# Patient Record
Sex: MALE | Race: WHITE | NOT HISPANIC OR LATINO | Employment: OTHER | URBAN - METROPOLITAN AREA
[De-identification: names, ages, dates, MRNs, and addresses within clinical notes are randomized per-mention and may not be internally consistent; named-entity substitution may affect disease eponyms.]

---

## 2023-11-01 DIAGNOSIS — G62.89 OTHER POLYNEUROPATHY: ICD-10-CM

## 2023-11-01 RX ORDER — ATORVASTATIN CALCIUM 40 MG/1
40 TABLET, FILM COATED ORAL NIGHTLY
COMMUNITY

## 2023-11-01 RX ORDER — GABAPENTIN 400 MG/1
1200 CAPSULE ORAL 3 TIMES DAILY
COMMUNITY
Start: 2023-10-12 | End: 2023-11-01 | Stop reason: SDUPTHER

## 2023-11-01 RX ORDER — ZOLPIDEM TARTRATE 12.5 MG/1
12.5 TABLET, FILM COATED, EXTENDED RELEASE ORAL NIGHTLY PRN
COMMUNITY

## 2023-11-01 RX ORDER — OMEPRAZOLE 20 MG/1
20 CAPSULE, DELAYED RELEASE ORAL 2 TIMES DAILY
COMMUNITY

## 2023-11-01 RX ORDER — MELOXICAM 7.5 MG/1
7.5 TABLET ORAL DAILY
COMMUNITY

## 2023-11-01 RX ORDER — NORTRIPTYLINE HYDROCHLORIDE 10 MG/1
20 CAPSULE ORAL NIGHTLY
COMMUNITY
Start: 2017-11-10 | End: 2024-04-30 | Stop reason: SDUPTHER

## 2023-11-01 RX ORDER — LOSARTAN POTASSIUM 50 MG/1
1 TABLET ORAL DAILY
COMMUNITY
Start: 2017-09-07

## 2023-11-01 RX ORDER — ACETAMINOPHEN 500 MG
2 TABLET ORAL DAILY
COMMUNITY
Start: 2013-08-02

## 2023-11-02 RX ORDER — GABAPENTIN 400 MG/1
1200 CAPSULE ORAL 3 TIMES DAILY
Qty: 270 CAPSULE | Refills: 2 | Status: SHIPPED | OUTPATIENT
Start: 2023-11-02 | End: 2023-12-09 | Stop reason: SDUPTHER

## 2023-11-16 ENCOUNTER — TELEPHONE (OUTPATIENT)
Dept: NEUROLOGY | Facility: HOSPITAL | Age: 83
End: 2023-11-16
Payer: MEDICARE

## 2023-11-16 NOTE — TELEPHONE ENCOUNTER
----- Message from Marquise Kemp DO sent at 11/15/2023  5:31 PM EST -----  Regarding: RE: Mike Olmos -  current symptoms  Contact: [phone]  He is going to increase his evening dose by 400mg. I also spoke with him about trying something topical. Phan, I have a transdermal therapeutics form for you that we can send tomorrow.  ----- Message -----  From: Alivia Glass  Sent: 11/14/2023   9:39 AM EST  To: Diane Reich MD; Marquise Kemp DO; #  Subject: Mike Olmos -  current symptoms                Patient called back in; said he spoke to you regarding burning in his feet that occurs at night still (even with taking gabapentin 1200 mg tid.  He is asking if he should take an extra around dinner to help with this.  He is aware that Dr. Reich is out.

## 2023-11-16 NOTE — TELEPHONE ENCOUNTER
----- Message from Marquise Kemp DO sent at 11/15/2023  5:31 PM EST -----  Regarding: RE: Mike Olmos -  current symptoms  Contact: [phone]  He is going to increase his evening dose by 400mg. I also spoke with him about trying something topical. Phan, I have a transdermal therapeutics form for you that we can send tomorrow.  ----- Message -----  From: Alivia Glass  Sent: 11/14/2023   9:39 AM EST  To: Diane Reich MD; Marquise Kemp DO; #  Subject: Mike Omlos -  current symptoms                Patient called back in; said he spoke to you regarding burning in his feet that occurs at night still (even with taking gabapentin 1200 mg tid.  He is asking if he should take an extra around dinner to help with this.  He is aware that Dr. Reich is out.

## 2023-12-08 ENCOUNTER — OFFICE VISIT (OUTPATIENT)
Dept: NEUROLOGY | Facility: CLINIC | Age: 83
End: 2023-12-08
Payer: MEDICARE

## 2023-12-08 DIAGNOSIS — G62.89 OTHER POLYNEUROPATHY: ICD-10-CM

## 2023-12-08 PROCEDURE — 99213 OFFICE O/P EST LOW 20 MIN: CPT | Performed by: PSYCHIATRY & NEUROLOGY

## 2023-12-08 NOTE — PROGRESS NOTES
Date of Service: 12/8/2023  Patient: Mike Olmos  MRN: [id number]  Referring Provider: No ref. provider found  Primary Care Physician: Steve Workman MD     History of Present Illness:    Mr. Olmos is a 83 y.o. male who presents for follow up of peripheral neuropathy.    Had decreased gabapentin to below 1200mg TID as discussed at last visit with target to get to 800 mg TID. He is not certain how much he reduced his gabapentin to before his symptoms worsened.  Increased sensation and pain and swelling in the feet led to increase.     He called our office and his gabapentin was increased to [phone].    He has increased to [id number] mg TID. Has been on nortriptyline 20mg at bedtime. The last couple days have been better. He had a phone call with [facility] Nov 16 with plan to increase the gabapentin to [phone] mg TID--he has been on an increased dose for ~ 2 to 3 weeks.     Also takes Ambien at bedtime for insomnia.     Still walking and exercising.      Problem List Items Addressed This Visit    None      No Known Allergies     Medications:    Current Outpatient Medications:     atorvastatin (Lipitor) 40 mg tablet, Take 1 tablet (40 mg) by mouth once daily at bedtime., Disp: , Rfl:     cholecalciferol (Vitamin D-3) 50 mcg (2,000 unit) capsule, Take 2 tablets by mouth once daily., Disp: , Rfl:     gabapentin (Neurontin) 400 mg capsule, Take 3 capsules (1,200 mg) by mouth 3 times a day., Disp: 270 capsule, Rfl: 2    losartan (Cozaar) 50 mg tablet, Take 1 tablet (50 mg) by mouth once daily., Disp: , Rfl:     meloxicam (Mobic) 7.5 mg tablet, Take 1 tablet (7.5 mg) by mouth once daily., Disp: , Rfl:     nortriptyline (Pamelor) 10 mg capsule, Take 2 capsules (20 mg) by mouth once daily at bedtime., Disp: , Rfl:     omeprazole (PriLOSEC) 20 mg DR capsule, Take 1 capsule (20 mg) by mouth 2 times a day., Disp: , Rfl:     zolpidem CR (Ambien CR) 12.5 mg ER tablet, Take 1 tablet (12.5 mg) by  mouth as needed at bedtime for sleep., Disp: , Rfl:      Physical Exam:     There were no vitals taken for this visit.    Brief Neurological Exam:    Motor: Strength 5/5 in distal and proximal muscles including toe extension, flexion, ankle dorsiflexion and plantarflexion    Sensation in feet intact to light touch and pinprick in both feet    Results:     The following labs, imaging, and results were personally reviewed and demonstrated:    Labs:  Lab Results   Component Value Date    HGBA1C 5.7 (H) 12/04/2023       Impression/Plan:     Impression:  Mike Olmos is a 83 y.o. who presents for follow-up of peripheral neuropathy. His symptoms worsened when he attempted to decrease gabapentin below 1200 mg TID.  He is currently on [id number] mg TID.  He started this increased dose about 3 weeks ago and has been tolerating it. His symptoms feel improved over the last couple days.    He is also on nortriptyline 20 mg at night.     Plan:  -- continue notriptyline 20 mg at night  -- continue gabapentin [phone] mg TID       Reviewed and approved by THERESA LAURENT on 12/8/23 at 1:19 PM.    I personally spent 25 minutes on the day of the visit completing the review of the medical record and outside records, obtaining history and performing an appropriate physical exam, patient care, counseling and education, communicating with the patient, coordinating care and performing appropriate clinical documentation.

## 2023-12-09 RX ORDER — GABAPENTIN 400 MG/1
CAPSULE ORAL
Qty: 300 CAPSULE | Refills: 10 | Status: SHIPPED | OUTPATIENT
Start: 2023-12-09 | End: 2024-02-07 | Stop reason: DRUGHIGH

## 2023-12-21 ENCOUNTER — TELEMEDICINE (OUTPATIENT)
Dept: NEUROLOGY | Facility: HOSPITAL | Age: 83
End: 2023-12-21
Payer: MEDICARE

## 2023-12-21 DIAGNOSIS — G62.9 NEUROPATHY: Primary | ICD-10-CM

## 2023-12-21 PROCEDURE — 99212 OFFICE O/P EST SF 10 MIN: CPT | Performed by: PSYCHIATRY & NEUROLOGY

## 2023-12-21 PROCEDURE — 99212 OFFICE O/P EST SF 10 MIN: CPT | Mod: GT,95 | Performed by: PSYCHIATRY & NEUROLOGY

## 2023-12-21 NOTE — PROGRESS NOTES
Virtual or Telephone Consent    A telephone visit (audio only) between the patient (at the originating site) and the provider (at the distant site) was utilized to provide this telehealth service.   Verbal consent was requested and obtained from Mike Olmos on this date, 01/04/24 for a telehealth visit.     Date of Service: 12/21/2023  Patient: Mike Olmos  MRN: [id number]  Referring Provider: No ref. provider found  Primary Care Physician: Steve Workman MD     History of Present Illness:    Mr. Olmos is a 83 y.o. male who presents for follow up of pain in the feet.     Since increasing his medications, his pain is better controlled. He sometimes has occasional right and sometimes left foot symptoms, but not always both at the same type.  A swelling feeling--not actually swollen, a light burning pain feeling. Lasts for a minute or so. It always occurs when feet are elevated and moreso at the end of the day.    It does not bother him or interfere with his daily life. The fourth capsule of gabapentin has helped him      No Known Allergies     Medications:    Current Outpatient Medications:     atorvastatin (Lipitor) 40 mg tablet, Take 1 tablet (40 mg) by mouth once daily at bedtime., Disp: , Rfl:     cholecalciferol (Vitamin D-3) 50 mcg (2,000 unit) capsule, Take 2 tablets by mouth once daily., Disp: , Rfl:     gabapentin (Neurontin) 400 mg capsule, Take 3 capsules in the morning, 3 capsules in the afternoon, and 4 capsules at night time, Disp: 300 capsule, Rfl: 10    losartan (Cozaar) 50 mg tablet, Take 1 tablet (50 mg) by mouth once daily., Disp: , Rfl:     meloxicam (Mobic) 7.5 mg tablet, Take 1 tablet (7.5 mg) by mouth once daily., Disp: , Rfl:     nortriptyline (Pamelor) 10 mg capsule, Take 2 capsules (20 mg) by mouth once daily at bedtime., Disp: , Rfl:     omeprazole (PriLOSEC) 20 mg DR capsule, Take 1 capsule (20 mg) by mouth 2 times a day., Disp: , Rfl:     zolpidem CR (Ambien CR) 12.5 mg ER  tablet, Take 1 tablet (12.5 mg) by mouth as needed at bedtime for sleep., Disp: , Rfl:      Physical Exam:     There were no vitals taken for this visit.    Telephone Visit, therefore no exam      Results:     The following labs, imaging, and results were personally reviewed and demonstrated:    Labs:  Lab Results   Component Value Date    HGBA1C 5.7 (H) 12/04/2023       Impression/Plan:     Impression:  Mike Olmos is a 83 y.o. who presents for follow-up of pain in his feet which has improved with mild increase in his gabapentin. He is currently taking [phone] mg.    Plan:  - continue gabapentin    --------------------------------------------------------------------------------------------------------------------------    I personally spent 10 minutes on the day of the visit completing the review of the medical record and outside records, obtaining history, patient care, counseling and education, coordinating care and performing appropriate clinical documentation.    Diane Reich MD  Neuromuscular Neurology  [facility]  Office Phone Number: [phone]

## 2024-02-07 DIAGNOSIS — G62.89 OTHER POLYNEUROPATHY: ICD-10-CM

## 2024-02-07 RX ORDER — GABAPENTIN 800 MG/1
TABLET ORAL
Qty: 450 TABLET | Refills: 2 | Status: SHIPPED | OUTPATIENT
Start: 2024-02-07 | End: 2024-04-30 | Stop reason: SDUPTHER

## 2024-04-30 DIAGNOSIS — G62.89 OTHER POLYNEUROPATHY: ICD-10-CM

## 2024-04-30 DIAGNOSIS — G62.9 NEUROPATHY: ICD-10-CM

## 2024-04-30 RX ORDER — NORTRIPTYLINE HYDROCHLORIDE 10 MG/1
20 CAPSULE ORAL NIGHTLY
Qty: 180 CAPSULE | Refills: 3 | Status: SHIPPED | OUTPATIENT
Start: 2024-04-30 | End: 2025-04-30

## 2024-04-30 RX ORDER — GABAPENTIN 400 MG/1
CAPSULE ORAL
Qty: 300 TABLET | Refills: 6 | Status: SHIPPED | OUTPATIENT
Start: 2024-04-30

## 2024-05-16 ENCOUNTER — OFFICE VISIT (OUTPATIENT)
Dept: NEUROLOGY | Facility: CLINIC | Age: 84
End: 2024-05-16
Payer: MEDICARE

## 2024-05-16 DIAGNOSIS — G62.9 PERIPHERAL POLYNEUROPATHY: Primary | ICD-10-CM

## 2024-05-16 PROCEDURE — 99213 OFFICE O/P EST LOW 20 MIN: CPT | Performed by: PSYCHIATRY & NEUROLOGY

## 2024-05-16 NOTE — PROGRESS NOTES
Date of Service: 5/16/2024  Patient: Mike Olmos  MRN: [id number]  Referring Provider: No ref. provider found  Primary Care Physician: Steve Workman MD     History of Present Illness:    Mr. Olmos is a 83 y.o. male who presents for follow up of peripheral neuropathy.  He has improvement in his pain with taking gabapentin [phone] mg. He continues to complete a consistent exercise routine.          Problem List Items Addressed This Visit    None      No Known Allergies     Medications:    Current Outpatient Medications:     atorvastatin (Lipitor) 40 mg tablet, Take 1 tablet (40 mg) by mouth once daily at bedtime., Disp: , Rfl:     cholecalciferol (Vitamin D-3) 50 mcg (2,000 unit) capsule, Take 2 tablets by mouth once daily., Disp: , Rfl:     gabapentin (Neurontin) 400 mg capsule, Take 3 caps in the morning, 3 caps in the afternoon, and 4 caps in the evening., Disp: 300 tablet, Rfl: 6    losartan (Cozaar) 50 mg tablet, Take 1 tablet (50 mg) by mouth once daily., Disp: , Rfl:     meloxicam (Mobic) 7.5 mg tablet, Take 1 tablet (7.5 mg) by mouth once daily., Disp: , Rfl:     nortriptyline (Pamelor) 10 mg capsule, Take 2 capsules (20 mg) by mouth once daily at bedtime., Disp: 180 capsule, Rfl: 3    omeprazole (PriLOSEC) 20 mg DR capsule, Take 1 capsule (20 mg) by mouth 2 times a day., Disp: , Rfl:     zolpidem CR (Ambien CR) 12.5 mg ER tablet, Take 1 tablet (12.5 mg) by mouth as needed at bedtime for sleep., Disp: , Rfl:      Physical Exam:     There were no vitals taken for this visit.    Brief Neurological Exam:  Alert and Awake  Strength is 5/5 distal and proximal in arms and legs    Sensation is intact to light touch and pinprick in his hands and feet.        Results:     The following labs, imaging, and results were personally reviewed and demonstrated:    Labs:  Lab Results   Component Value Date    HGBA1C 5.7 (H) 12/04/2023       Impression/Plan:     Impression:  Mike Olmos is a 83 y.o. who  presents for follow-up of peripheral neuropathy, stable with neuropathic pain. Pain has improved with being on gabapentin [id number]. He also takes nortriptyline 20 mg at bedtime.    Plan:  -- continue gabapentin  -- continue notriptyline       Reviewed and approved by THERESA LAURENT on 5/16/24 at 2:56 PM.    I personally spent 25 minutes on the day of the visit completing the review of the medical record and outside records, obtaining history and performing an appropriate physical exam, patient care, counseling and education, independently reviewing results, communicating with the patient, coordinating care

## 2024-09-19 ENCOUNTER — TELEPHONE (OUTPATIENT)
Dept: DERMATOLOGY | Facility: CLINIC | Age: 84
End: 2024-09-19
Payer: MEDICARE

## 2024-09-19 NOTE — TELEPHONE ENCOUNTER
Called patient to discuss pre-operative expectations ahead of his procedure with Dr. Blanc for Mohs surgery. He voiced no other questions or concerns.

## 2024-09-23 ENCOUNTER — APPOINTMENT (OUTPATIENT)
Dept: DERMATOLOGY | Facility: CLINIC | Age: 84
End: 2024-09-23
Payer: MEDICARE

## 2024-09-23 VITALS — HEART RATE: 75 BPM | DIASTOLIC BLOOD PRESSURE: 81 MMHG | SYSTOLIC BLOOD PRESSURE: 137 MMHG

## 2024-09-23 DIAGNOSIS — C44.319 BASAL CELL CARCINOMA (BCC) OF SKIN OF OTHER PART OF FACE: ICD-10-CM

## 2024-09-23 PROCEDURE — 17311 MOHS 1 STAGE H/N/HF/G: CPT | Performed by: STUDENT IN AN ORGANIZED HEALTH CARE EDUCATION/TRAINING PROGRAM

## 2024-09-23 PROCEDURE — 13132 CMPLX RPR F/C/C/M/N/AX/G/H/F: CPT | Performed by: STUDENT IN AN ORGANIZED HEALTH CARE EDUCATION/TRAINING PROGRAM

## 2024-09-23 PROCEDURE — 17312 MOHS ADDL STAGE: CPT | Performed by: STUDENT IN AN ORGANIZED HEALTH CARE EDUCATION/TRAINING PROGRAM

## 2024-09-23 RX ORDER — ASPIRIN 81 MG/1
81 TABLET ORAL
COMMUNITY
Start: 2023-02-20

## 2024-09-23 NOTE — PROGRESS NOTES
Office Visit Note  Date: 9/23/2024  Surgeon:  Patrice Blanc MD  Office Location:  [address]  [address]  [facility] [address]  Dept: [phone]  Dept Fax: [phone]  Referring Provider: Donnell Durand MD  [address]  [address]  [place] [id number]    Subjective   Mike Olmos is a 83 y.o. male who presents for the following: MOHS Surgery    According to the patient, the lesion has been present for approximately 6 months at the time of diagnosis.  The lesion is not causing symptoms.  The lesion has not been treated previously.    The patient does not have a pacemaker / defibrillator.  The patient does have a heart valve / joint replacement.    The patient is on blood thinners.  The patient does not have a history of hepatitis B or C.  The patient does not have a history of HIV.  The patient does not have a history of immunosuppression (e.g. organ transplantation, malignancy, medications)    Review of Systems:  No other skin or systemic complaints other than what is documented elsewhere in the note.    MEDICAL HISTORY: clinically relevant history including significant past medical history, medications and allergies was reviewed and documented in Epic.    Objective   Well appearing patient in no apparent distress; mood and affect are within normal limits.  Vital signs: See record.  Noted on the Right Cheek  Is a 0.8 x 0.8 cm scar        The patient confirmed the identified site.    Discussion:  The nature of the diagnosis was explained. The lesion is a skin cancer.  It has a risk of local growth and distant spread. The condition is associated with sun exposure.  Warning signs of non-melanoma skin cancer discussed. Patient was instructed to perform monthly self skin examination.  We recommended that the patient have regular full skin exams given an increased risk of subsequent skin cancers. The patient was instructed to use sun protective behaviors including use of broad  spectrum sunscreens and sun protective clothing to reduce risk of skin cancers.      Risks, benefits, side effects of Mohs surgery were discussed with patient and the patient voiced understanding.  It was explained that even though the cure rate of Mohs is very high it is not 100%. Risks of surgery including but not limited to bleeding, infection, numbness, nerve damage, and scar were reviewed.  Discussion included wound care requirements, activity restrictions, likely scar outcome and time to heal.     After Mohs surgery, the defect may need to be repaired surgically and the scar may be longer than the original lesion.  Reconstruction options, risks, and benefits were reviewed including second intention healing, linear repair (4-1 ratio was explained), local flaps, skin grafts, cartilage grafts and interpolation flaps (the need for multiple surgeries was explained). Possible outcomes were reviewed including likely scar appearance, failure of flap survival, infection, bleeding and the need for revision surgery.     The pathology was reviewed, the photograph was reviewed, and the referring physician's note was reviewed.    Patient elected for Mohs surgery.

## 2024-09-23 NOTE — PROGRESS NOTES
Mohs Surgery Operative Note    Date of Surgery:  9/23/2024  Surgeon:  Patrice Blanc MD  Office Location: [address]  [address]  [facility] [address]  Dept: [phone]  Dept Fax: [phone]  Referring Provider: Donnell Durand MD  [address]  [address]  [place] [address]      Assessment/Plan   Pre-procedure:   Obtained informed consent: written from patient  The surgical site was identified and confirmed with the patient.     Intra-operative:   Audible time out called at : 08:55 AM 09/23/24  by: DESIREE CHIRINOS RN   Verified patient name, birthdate, site, specimen bottle label & requisition.    The planned procedure(s) was again reviewed with the patient. The risks of bleeding, infection, nerve damage and scarring were reviewed. Written authorization was obtained. The patient identity, surgical site, and planned procedure(s) were verified. The provider acted as both surgeon and pathologist.     Basal cell carcinoma (BCC) of skin of other part of face  Right Cheek    Mohs surgery    Consent obtained: written    Universal Protocol:  Procedure explained and questions answered to patient or proxy's satisfaction: Yes    Test results available and properly labeled: Yes    Pathology report reviewed: Yes    External notes reviewed: Yes    Photo or diagram used for site identification: Yes    Site/side marked: Yes    Slide independently reviewed by Mohs surgeon: Yes    Immediately prior to procedure a time out was called: Yes    Patient identity confirmed: verbally with patient  Preparation: Patient was prepped and draped in usual sterile fashion      Anticoagulation:  Is the patient taking prescription anticoagulant and/or aspirin prescribed/recommended by a physician? Yes    Was the anticoagulation regimen changed prior to Mohs? No      Anesthesia:  Anesthesia method: local infiltration  Local anesthetic: lidocaine 1% WITH epi    Procedure Details:  Case ID Number:  [id number]  Biopsy accession number: [id number]  Date of biopsy: 7/17/2024  Pre-Op diagnosis: basal cell carcinoma  BCC subtype: nodular  Surgical site (from skin exam): Right Cheek  Pre-operative length (cm): 0.8  Pre-operative width (cm): 0.8  Indications for Mohs surgery: anatomic location where tissue conservation is critical  Previously treated? No      Micrographic Surgery Details:  Post-operative length (cm): 1.2  Post-operative width (cm): 1.6  Number of Mohs stages: 3    Stage 1     Comments: The patient was brought into the operating room and placed in the procedure chair in the appropriate position.  The area positive by previous biopsy was identified and confirmed with the patient. The area of clinically obvious tumor was debulked using a curette and/or scalpel as needed. An incision was made following the Mohs approach through the skin. The specimen was taken to the lab, divided into 2 piece(s) and appropriately chromacoded and processed.    Tumor was seen on the lateral margins as indicated on the on the Mohs map.  Nodular basal cell carcinoma. Histologic examination revealed large tumor aggregates of atypical basaloid cells with peripheral palisading and tumoral clefting.              Tumor features identified on Mohs section: basal carcinoma      Depth of invasion: dermis    Stage 2     Comments: The area of positivity as noted on the Mohs map in the previous stage was identified and removed using the Mohs technique. The specimen was taken to the lab and appropriately chromacoded and processed in 2 piece(s).    Tumor was seen on the lateral margins as indicated on the on the Mohs map.  Nodular basal cell carcinoma. Histologic examination revealed large tumor aggregates of atypical basaloid cells with peripheral palisading and tumoral clefting. AND  Superficial basal cell carcinoma. Histologic examination revealed small buds of atypical basaloid cells with peripheral palisading and tumoral  clefting.           Tumor features identified on Mohs section: basal carcinoma     Depth of invasion: dermis    Stage 3     Comments: The area of positivity as noted on the Mohs map in the previous stage was identified and removed using the Mohs technique. The specimen was taken to the lab and appropriately chromacoded and processed in 2 piece(s).       Tumor features identified on Mohs section: no tumor identified    Depth of defect: subcutaneous fat    Patient tolerance of procedure: tolerated well, no immediate complications    Reconstruction:  Was the defect reconstructed? Yes    Was reconstruction performed by the same Mohs surgeon? Yes    Setting of reconstruction: outpatient office  Type of reconstruction: linear  Linear reconstruction: complex  Subcutaneous Layers (Deep Stitches)   Suture size:  5-0  Suture type:  Vicryl  Stitches:  Buried vertical mattress  Fine/surface layer approximation (top stitches)   Epidermal/Superficial suture size:  5-0  Epidermal/Superficial suture type:  Fast-absorbing gut  Stitches: simple interrupted and simple running    Hemostasis achieved with: electrodesiccation  Outcome: patient tolerated procedure well with no complications    Post-procedure details: sterile dressing applied and wound care instructions given    Dressing type: pressure dressing      Staff Communication: Dermatology Local Anesthesia: 1 % Lidocaine / Epinephrine - Amount: 6.5 ml        Complex Linear Repair - Wide Undermining:  Given the location and size of the defect, it was determined that a complex layered linear closure was required to restore normal anatomy and function. The repair was considered complex because extensive undermining was required and performed. The amount of undermining performed was greater than the maximum width of the defect as measured perpendicular to the closure line along at least one entire edge of the defect. Standing cutaneous cones were removed using Burow's triangles. The  wound edges were brought into close approximation with buried vertical mattress sutures. The remainder of the wound was then closed with epidermal top sutures.    The final repair measured 4.1 cm                Wound care was discussed, and the patient was given written post-operative wound care instructions.      The patient will follow up with Patrice Blanc MD as needed for any post operative problems or concerns, and will follow up with their primary dermatologist as scheduled.

## 2024-09-23 NOTE — LETTER
[facility] Provider/Referral Letter Treatment Plan    Patient: Mike Olmos   YOB: 1940   Date of Visit: 9/23/2024   MRN: [id number]     Donnell Durand MD  [id number] [address] [address]    Dear Donnell Durand MD,     I had the pleasure of seeing Mike Olmos today in consultation at your request for evaluation and treatment of:  1. Basal cell carcinoma (BCC) of skin of other part of face  Right Cheek    Mohs surgery    Staff Communication: Dermatology Local Anesthesia: 1 % Lidocaine / Epinephrine - Amount: 6.5 ml      Mohs surgery was indicated because of the nature of the lesion and the need to obtain the highest cure rate.  After informed consent was obtained, the patient underwent the procedure without complication.    The skin cancer was removed, wound care instructions were given and the patient was advised to follow up with you.  I will see the patient post-operatively as indicated.    Thank you very much for your confidence in me and for allowing me to share in the care of this patient.    1. Basal cell carcinoma (BCC) of skin of other part of face  Right Cheek  Is a 0.8 x 0.8 cm scar    Mohs surgery    Consent obtained: written    Universal Protocol:  Procedure explained and questions answered to patient or proxy's satisfaction: Yes    Test results available and properly labeled: Yes    Pathology report reviewed: Yes    External notes reviewed: Yes    Photo or diagram used for site identification: Yes    Site/side marked: Yes    Slide independently reviewed by Mohs surgeon: Yes    Immediately prior to procedure a time out was called: Yes    Patient identity confirmed: verbally with patient  Preparation: Patient was prepped and draped in usual sterile fashion      Anticoagulation:  Is the patient taking prescription anticoagulant and/or aspirin prescribed/recommended by a physician? Yes    Was the anticoagulation regimen changed prior to Mohs? No      Anesthesia:  Anesthesia method:  local infiltration  Local anesthetic: lidocaine 1% WITH epi    Procedure Details:  Case ID Number: [id number]  Biopsy accession number: [id number]  Date of biopsy: 7/17/2024  Pre-Op diagnosis: basal cell carcinoma  BCC subtype: nodular  Surgical site (from skin exam): Right Cheek  Pre-operative length (cm): 0.8  Pre-operative width (cm): 0.8  Indications for Mohs surgery: anatomic location where tissue conservation is critical  Previously treated? No      Micrographic Surgery Details:  Post-operative length (cm): 1.2  Post-operative width (cm): 1.6  Number of Mohs stages: 3    Stage 1     Comments: The patient was brought into the operating room and placed in the procedure chair in the appropriate position.  The area positive by previous biopsy was identified and confirmed with the patient. The area of clinically obvious tumor was debulked using a curette and/or scalpel as needed. An incision was made following the Mohs approach through the skin. The specimen was taken to the lab, divided into 2 piece(s) and appropriately chromacoded and processed.    Tumor was seen on the lateral margins as indicated on the on the Mohs map.  Nodular basal cell carcinoma. Histologic examination revealed large tumor aggregates of atypical basaloid cells with peripheral palisading and tumoral clefting.              Tumor features identified on Mohs section: basal carcinoma      Depth of invasion: dermis    Stage 2     Comments: The area of positivity as noted on the Mohs map in the previous stage was identified and removed using the Mohs technique. The specimen was taken to the lab and appropriately chromacoded and processed in 2 piece(s).    Tumor was seen on the lateral margins as indicated on the on the Mohs map.  Nodular basal cell carcinoma. Histologic examination revealed large tumor aggregates of atypical basaloid cells with peripheral palisading and tumoral clefting. AND  Superficial basal cell carcinoma. Histologic examination  revealed small buds of atypical basaloid cells with peripheral palisading and tumoral clefting.           Tumor features identified on Mohs section: basal carcinoma     Depth of invasion: dermis    Stage 3     Comments: The area of positivity as noted on the Mohs map in the previous stage was identified and removed using the Mohs technique. The specimen was taken to the lab and appropriately chromacoded and processed in 2 piece(s).       Tumor features identified on Mohs section: no tumor identified    Depth of defect: subcutaneous fat    Patient tolerance of procedure: tolerated well, no immediate complications    Reconstruction:  Was the defect reconstructed? Yes    Was reconstruction performed by the same Mohs surgeon? Yes    Setting of reconstruction: outpatient office  Type of reconstruction: linear  Linear reconstruction: complex  Subcutaneous Layers (Deep Stitches)   Suture size:  5-0  Suture type:  Vicryl  Stitches:  Buried vertical mattress  Fine/surface layer approximation (top stitches)   Epidermal/Superficial suture size:  5-0  Epidermal/Superficial suture type:  Fast-absorbing gut  Stitches: simple interrupted and simple running    Hemostasis achieved with: electrodesiccation  Outcome: patient tolerated procedure well with no complications    Post-procedure details: sterile dressing applied and wound care instructions given    Dressing type: pressure dressing      Staff Communication: Dermatology Local Anesthesia: 1 % Lidocaine / Epinephrine - Amount: 6.5 ml           Sincerely,       Patrice Blanc MD  [facility]

## 2024-10-30 DIAGNOSIS — G62.89 OTHER POLYNEUROPATHY: ICD-10-CM

## 2024-10-30 RX ORDER — GABAPENTIN 400 MG/1
CAPSULE ORAL
Qty: 300 CAPSULE | Refills: 6 | Status: SHIPPED | OUTPATIENT
Start: 2024-10-30

## 2024-11-07 ENCOUNTER — APPOINTMENT (OUTPATIENT)
Dept: NEUROLOGY | Facility: CLINIC | Age: 84
End: 2024-11-07
Payer: MEDICARE

## 2024-12-06 ENCOUNTER — APPOINTMENT (OUTPATIENT)
Dept: NEUROLOGY | Facility: CLINIC | Age: 84
End: 2024-12-06
Payer: MEDICARE

## 2024-12-06 DIAGNOSIS — G62.9 NEUROPATHY: ICD-10-CM

## 2024-12-06 PROCEDURE — 99213 OFFICE O/P EST LOW 20 MIN: CPT | Performed by: PSYCHIATRY & NEUROLOGY

## 2024-12-06 RX ORDER — NORTRIPTYLINE HYDROCHLORIDE 10 MG/1
20 CAPSULE ORAL NIGHTLY
Qty: 180 CAPSULE | Refills: 3 | Status: SHIPPED | OUTPATIENT
Start: 2024-12-06 | End: 2025-12-06

## 2024-12-06 NOTE — PROGRESS NOTES
Neuromuscular Medicine Follow Up     Mike Olmos, MRN: [id number], : 1940  Reason for Visit: No chief complaint on file.     Primary Care Physician: Steve Workman MD     Impression/Plan:   Mike Olmos is an 84 year old man with a history of peripheral neuropathy with neuropathic pain. His exam is stable. He is well-controlled on gabapentin [phone] and nortriptyline 20 mg at bedtime.     When we tried to reduce the gabapentin last year in , he had severe worsening of his symptoms, thus we will continue with the current dosing of gabapentin.     Plan:  -- continue gabapentin [phone]  -- continue notriptyline 20 mg at bedtime, renewed today    Diane Reich MD  Neuromuscular Neurology  [facility]  Office Phone Number: [phone]    History of Present Illness:    Mr. Olmos is a 84 y.o. male with peripheral neuropathy who presents for follow up. His neuropathic pain symptoms are well-controlled on [phone] mg gabapentin and 20 mg notriptyline  Since last visit, he has had no major changes in his neuropathy.     He continues to walk 2-3 times a week on an indoor track at the facility located across the street from his home.    He discussed with him he wants to cut down on the medications he is taking. We discussed we can trial reducing the notriptyline to 10 mg as an initial step. After thinking about this, he decided against this for now.     Last year in , when we tried to reduce the gabapentin from 1200 mg TID at that time lower, he had severe worsening of his symptoms and his gabapentin needed to be adjusted to [phone] mg.    At night time, when he gets up to use the restroom, he feels some unsteadiness. Denies lightheadedness. Reports he does have a light on when he wakes up and does not have difficulty seeing. States he takes his time getting to the bathroom.     Prior History:     Relevant past medical, surgical, family, and social histories,  along with ROS was reviewed and pertinent details noted above.     No Known Allergies   Medications:    Current Outpatient Medications:     aspirin 81 mg EC tablet, Take 1 tablet (81 mg) by mouth once daily., Disp: , Rfl:     atorvastatin (Lipitor) 40 mg tablet, Take 1 tablet (40 mg) by mouth once daily at bedtime., Disp: , Rfl:     cholecalciferol (Vitamin D-3) 50 mcg (2,000 unit) capsule, Take 2 tablets by mouth once daily., Disp: , Rfl:     gabapentin (Neurontin) 400 mg capsule, Take 3 caps in the morning, 3 caps in the afternoon, and 4 caps in the evening., Disp: 300 capsule, Rfl: 6    losartan (Cozaar) 50 mg tablet, Take 1 tablet (50 mg) by mouth once daily., Disp: , Rfl:     meloxicam (Mobic) 7.5 mg tablet, Take 1 tablet (7.5 mg) by mouth once daily., Disp: , Rfl:     nortriptyline (Pamelor) 10 mg capsule, Take 2 capsules (20 mg) by mouth once daily at bedtime., Disp: 180 capsule, Rfl: 3    omeprazole (PriLOSEC) 20 mg DR capsule, Take 1 capsule (20 mg) by mouth 2 times a day., Disp: , Rfl:     zolpidem CR (Ambien CR) 12.5 mg ER tablet, Take 1 tablet (12.5 mg) by mouth as needed at bedtime for sleep., Disp: , Rfl:        Physical Exam:   There were no vitals taken for this visit.     Alert and Awake  Strength is 5/5 distal and proximal in arms and legs     Sensation is intact to light touch and pinprick in his hands and feet. Vibration is mildly reduced in the big toe as compared to the ankle and hand.    Reflexes:   2+ in bilateral biceps, and brachioradialis and 0 at the patellar and achilles bilaterally. Toes are downgoing to plantar stimulation.    Able to stand from chair and walk without difficulty.

## 2024-12-12 ENCOUNTER — APPOINTMENT (OUTPATIENT)
Dept: NEUROLOGY | Facility: CLINIC | Age: 84
End: 2024-12-12
Payer: MEDICARE

## 2025-01-20 ENCOUNTER — APPOINTMENT (OUTPATIENT)
Dept: GASTROENTEROLOGY | Facility: CLINIC | Age: 85
End: 2025-01-20
Payer: MEDICARE

## 2025-01-20 ENCOUNTER — LAB (OUTPATIENT)
Dept: LAB | Facility: LAB | Age: 85
End: 2025-01-20
Payer: MEDICARE

## 2025-01-20 VITALS — HEIGHT: 65 IN | WEIGHT: 158 LBS | BODY MASS INDEX: 26.33 KG/M2

## 2025-01-20 DIAGNOSIS — R19.7 DIARRHEA, UNSPECIFIED TYPE: Primary | ICD-10-CM

## 2025-01-20 DIAGNOSIS — R19.7 DIARRHEA, UNSPECIFIED TYPE: ICD-10-CM

## 2025-01-20 LAB
C DIF TOX TCDA+TCDB STL QL NAA+PROBE: NOT DETECTED
GLIADIN PEPTIDE IGA SER IA-ACNC: <1 U/ML
TTG IGA SER IA-ACNC: <1 U/ML

## 2025-01-20 PROCEDURE — 99203 OFFICE O/P NEW LOW 30 MIN: CPT

## 2025-01-20 PROCEDURE — 87493 C DIFF AMPLIFIED PROBE: CPT

## 2025-01-20 PROCEDURE — 83516 IMMUNOASSAY NONANTIBODY: CPT

## 2025-01-20 PROCEDURE — 89125 SPECIMEN FAT STAIN: CPT

## 2025-01-20 PROCEDURE — 1159F MED LIST DOCD IN RCRD: CPT

## 2025-01-20 PROCEDURE — 87328 CRYPTOSPORIDIUM AG IA: CPT

## 2025-01-20 PROCEDURE — 87329 GIARDIA AG IA: CPT

## 2025-01-20 PROCEDURE — 82653 EL-1 FECAL QUANTITATIVE: CPT

## 2025-01-20 PROCEDURE — 87506 IADNA-DNA/RNA PROBE TQ 6-11: CPT

## 2025-01-20 RX ORDER — TAMSULOSIN HYDROCHLORIDE 0.4 MG/1
0.4 CAPSULE ORAL NIGHTLY
COMMUNITY

## 2025-01-20 RX ORDER — METOPROLOL TARTRATE 25 MG/1
TABLET, FILM COATED ORAL
COMMUNITY

## 2025-01-20 RX ORDER — AMLODIPINE BESYLATE 2.5 MG/1
2.5 TABLET ORAL DAILY
COMMUNITY

## 2025-01-20 ASSESSMENT — ENCOUNTER SYMPTOMS
VOMITING: 0
SHORTNESS OF BREATH: 0
FEVER: 0
CONSTIPATION: 0
OCCASIONAL FEELINGS OF UNSTEADINESS: 0
LOSS OF SENSATION IN FEET: 0
COUGH: 0
DIARRHEA: 1
CHILLS: 0
RECTAL PAIN: 0
FATIGUE: 0
BLOOD IN STOOL: 0
ABDOMINAL PAIN: 0
DEPRESSION: 0
ANAL BLEEDING: 0
ABDOMINAL DISTENTION: 0
TROUBLE SWALLOWING: 0
NAUSEA: 0
APPETITE CHANGE: 0

## 2025-01-20 NOTE — PROGRESS NOTES
Subjective     History of Present Illness:   Mike Olmos is a 84 y.o. male with PMHx of stroke, aortic valve replacement, HTN, GERD, HLD who presents to GI clinic for further evaluation of constipation versus diarrhea    Today, patient reports constant diarrhea lately soft to loose up to 5 times daily.  Will be go 2-4 days without stool.  Has not had normal stools for about 4 years.  States he never has a formed stool.  Has tried miralax, metamucil per recommendation of previous GI.  Has been eating 2 prunes and prn sennakot.  Stool stays in his rectum and he cannot clean it out/fully evacuate.  Has lots of gas, when he expels it stool leaks out. Uses preparation H pads, gel and prn suppositories.  Has hemorrhoids and they get aggravated, strains when moving bowels.  Prilosec 20 mg controls heartburn. Appetite is normal.   12/2021 seen at [facility] and treated with Xifaxan.  Denies dyspepsia, melena, hematochezia, dysphagia, unintentional weight loss    Hx per F notes:   1/13/92 colon/egd: nl  7/15/93 ov with Marisela: nausea.  Rec: EGD, RUQ ultrasound  2/8/94 ov: nausea resolved, then recurred 3 weeks ago  2/10/94 egd ([facility]): gastritis. Bx a/c: nl  2/17/94 + h pylori  5/9/94 nausea resolved  11/28/94 colon (): tics. Hemorrhoids.  1/25/96 nausea recurred  1997 diverticulitis s/p colostomy which was eventually reversed  4/11/06 colon ([facility]): polyps  5/15/08 egd (nausea, done by DG): ssbe. Bx a/c/d2/e: nl  8/17/10 egd (nausea): irreg z line. Bx a/c/d2: nl.  Rec: GES  8/25/10 ges: nl  12/2011 colon: 1 adenoma removed  11/13/13 MRI brain: normal  11/13/13 cortrosyn stim: nl  11/19/13 ges: nl  12/9/13 sbft: single short segment of persistent nonspecific mild dilation but shows normal peristalsis  5/13/16 ct abd: normal small bowel. (I discussed this case with Dr. Hartley, radiology, and she said that there is no evidence of obstruction. 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 The patient has had chronic nausea symptoms, off/on, for at least 23 years, with a completely unrevealing workup and no warning symptoms (weight loss, etc).  An extensive evaluation has been unrevealing. His SBFT findings and recent CT do not explain his symptoms. I reviewed these studies with radiology and they concur. History of adenomatous polyps. Rec: TCA or SSRI therapy for likely cyclic nausea/vomiting syndrome. Arrange colonoscopy  6/28/16 colon: 11mm transverse polyp. REc repeat 3 years. Path ischemic changes, rec repeat 5 years  1/2/18 egd (chronic cough, globus, phlegm  In throat):  Nl. No cause was found for his symptoms. Given that he is  already on PPIs, doesn't have acid reflux symptoms,        denies dysphagia/odynophagia and his current complaints are all throat related, I feel his symptoms are likely ENT in etiology and not from a GI cause. Rec ENT eval  11/18/20 cdiff, cx, o&P: nl  The patient is here for loose bowels. He has been going 3-4x/day and the consistency is like wet mashed potatoes. He hasnt tried anything for this. He was on metamucil when it started so he stopped this to see if it would improve, but it didn't.  He actually was constipated over the past 2 days. He took a dose of miralax and he had a small normal appearing bowel movement. He may be improving. No new medicines. The cough/fullness in throat has all resolved. No longer nauseated either. The loose stools have been going on since September. Nothing changed (medicines, etc) at that time    Social ETOH, denies smoking or marijuana  Denies fxh GI cancer or IBD  Abdominal Surgeries: Cholecystectomy, partial colectomy for diverticulitis w/ reversal in 1990s, hernia repair    Last colonoscopy 12/2020 at [facility] for diarrhea: Unremarkable.  Biopsies negative microscopic colitis.  Repeat 7 years  Last EGD 1/2018: Unremarkable      Past Medical History  As per HPI.      Social History  he  reports that he has quit smoking. His smoking use included cigarettes. He has never used smokeless tobacco. Alcohol use questions deferred to the physician. He reports that he does not use drugs.     Family History  his family history is not on file.     Review of Systems  Review of Systems   Constitutional:  Negative for appetite change, chills, fatigue and fever.   HENT:  Negative for trouble swallowing.    Respiratory:  Negative for cough and shortness of breath.    Gastrointestinal:  Positive for diarrhea. Negative for abdominal distention, abdominal pain, anal bleeding, blood in stool, constipation, nausea, rectal pain and vomiting.       Allergies  No Known Allergies    Medications  Current Outpatient Medications   Medication Instructions    amLODIPine (NORVASC) 2.5 mg, oral, Daily    aspirin 81 mg, oral, Daily RT    atorvastatin (LIPITOR) 40 mg, oral, Nightly    cholecalciferol (Vitamin D-3) 50 mcg (2,000 unit) capsule 2 tablets, oral, Daily    gabapentin (Neurontin) 400 mg capsule Take 3 caps in the morning, 3 caps in the afternoon, and 4 caps in the evening.    losartan (Cozaar) 50 mg tablet 1 tablet, oral, Daily    meloxicam (MOBIC) 7.5 mg, oral, Daily    metoprolol tartrate (Lopressor) 25 mg tablet TAKE ONE-HALF (1/2) TABLET BY MOUTH EVERY 12 HOURS    nortriptyline (PAMELOR) 20 mg, oral, Nightly    omeprazole (PRILOSEC) 20 mg, oral, 2 times daily    tamsulosin (FLOMAX) 0.4 mg, oral, Nightly    zolpidem CR (AMBIEN CR) 12.5 mg, oral, Nightly PRN        Objective   There were no vitals taken for this visit.   Physical Exam  Constitutional:       Appearance: Normal appearance. He is normal weight.   HENT:      Mouth/Throat:      Mouth: Mucous membranes are dry.      Pharynx: Oropharynx is clear.   Cardiovascular:      Rate and Rhythm: Normal rate and regular rhythm.   Pulmonary:      Effort: Pulmonary effort is normal.      Breath sounds: Normal breath sounds. No wheezing or rhonchi.  "  Abdominal:      General: Abdomen is flat. Bowel sounds are normal. There is no distension.      Palpations: Abdomen is soft. There is no hepatomegaly.      Tenderness: There is no abdominal tenderness. There is no guarding or rebound. Negative signs include Carias's sign.      Hernia: No hernia is present.   Musculoskeletal:         General: Normal range of motion.   Skin:     General: Skin is warm and dry.   Neurological:      General: No focal deficit present.      Mental Status: He is alert and oriented to person, place, and time.   Psychiatric:         Mood and Affect: Mood normal.         Behavior: Behavior normal.           No results found for: \"WBC\", \"HGB\", \"HCT\", \"PLT\"  No results found for: \"NA\", \"K\", \"CL\", \"CO2\", \"BUN\", \"CREATININE\", \"CALCIUM\", \"PROT\", \"BILITOT\", \"ALKPHOS\", \"ALT\", \"AST\", \"GLUCOSE\"        Mike Olmos is a 84 y.o. male who presents to GI clinic for diarrhea.    Diarrhea  Consider infectious stool, pancreatic insufficiency, celiac, or IBS-D  -Stool studies ordered: Ova and parasite, PCR, C. difficile, fecal fat, pancreatic elastase  -Celiac serology ordered  -Consider Xifaxan    Call patient with results         Patty Todd, APRN-CNP         "

## 2025-01-20 NOTE — ASSESSMENT & PLAN NOTE
Consider infectious stool, pancreatic insufficiency, celiac, or IBS-D  -Stool studies ordered: Ova and parasite, PCR, C. difficile, fecal fat, pancreatic elastase  -Celiac serology ordered  -Consider Xifaxan    Call patient with results

## 2025-01-20 NOTE — PATIENT INSTRUCTIONS
Please get labs and stool test.  Return to any  lab.  I will notify you of results  If negative, we will treat you with a medication called Xifaxan

## 2025-01-21 LAB

## 2025-01-22 LAB
GLIADIN PEPTIDE IGG SER IA-ACNC: <0.56 FLU (ref 0–4.99)
TTG IGG SER IA-ACNC: <0.82 FLU (ref 0–4.99)

## 2025-01-23 LAB
CRYPTOSP AG STL QL IA: NEGATIVE
ELASTASE PANC STL-MCNT: >800 UG/G
FAT STL QL: NORMAL
G LAMBLIA AG STL QL IA: NEGATIVE
NEUTRAL FAT STL QL: NORMAL

## 2025-01-27 LAB — O+P STL MICRO: NEGATIVE

## 2025-01-28 DIAGNOSIS — K58.0 IRRITABLE BOWEL SYNDROME WITH DIARRHEA: Primary | ICD-10-CM

## 2025-01-29 ENCOUNTER — SPECIALTY PHARMACY (OUTPATIENT)
Dept: PHARMACY | Facility: CLINIC | Age: 85
End: 2025-01-29

## 2025-01-31 ENCOUNTER — SPECIALTY PHARMACY (OUTPATIENT)
Dept: PHARMACY | Facility: CLINIC | Age: 85
End: 2025-01-31

## 2025-02-07 ENCOUNTER — SPECIALTY PHARMACY (OUTPATIENT)
Dept: PHARMACY | Facility: CLINIC | Age: 85
End: 2025-02-07

## 2025-02-07 PROCEDURE — RXMED WILLOW AMBULATORY MEDICATION CHARGE

## 2025-02-10 ENCOUNTER — PHARMACY VISIT (OUTPATIENT)
Dept: PHARMACY | Facility: CLINIC | Age: 85
End: 2025-02-10
Payer: MEDICARE

## 2025-02-17 DIAGNOSIS — K64.8 OTHER HEMORRHOIDS: Primary | ICD-10-CM

## 2025-02-17 RX ORDER — HYDROCORTISONE 25 MG/G
OINTMENT TOPICAL 2 TIMES DAILY
Qty: 28 G | Refills: 0 | Status: SHIPPED | OUTPATIENT
Start: 2025-02-17

## 2025-02-19 ENCOUNTER — SPECIALTY PHARMACY (OUTPATIENT)
Dept: PHARMACY | Facility: CLINIC | Age: 85
End: 2025-02-19

## 2025-02-19 PROCEDURE — RXMED WILLOW AMBULATORY MEDICATION CHARGE

## 2025-02-20 ENCOUNTER — PHARMACY VISIT (OUTPATIENT)
Dept: PHARMACY | Facility: CLINIC | Age: 85
End: 2025-02-20
Payer: MEDICARE

## 2025-02-28 ENCOUNTER — APPOINTMENT (OUTPATIENT)
Dept: DERMATOLOGY | Facility: CLINIC | Age: 85
End: 2025-02-28
Payer: MEDICARE

## 2025-02-28 VITALS — HEART RATE: 52 BPM | DIASTOLIC BLOOD PRESSURE: 78 MMHG | SYSTOLIC BLOOD PRESSURE: 131 MMHG

## 2025-02-28 DIAGNOSIS — C44.319 BASAL CELL CARCINOMA (BCC) OF SKIN OF OTHER PART OF FACE: ICD-10-CM

## 2025-02-28 NOTE — PROGRESS NOTES
Mohs Surgery Operative Note    Date of Surgery:  2/28/2025  Surgeon:  Mary Braden MD  Office Location: [address] [place]  [facility] [address]  Dept: [phone]  Dept Fax: [phone]  Referring Provider: Donnell Durand MD    Assessment/Plan   Pre-procedure:   Obtained informed consent: written from patient  The surgical site was identified and confirmed with the patient.     Intra-operative:   Audible time out called at : 8:34AM 02/28/25  by: Rosalba Luevano RN   Verified patient name, birthdate, site, specimen bottle label & requisition.    The planned procedure(s) was again reviewed with the patient. The risks of bleeding, infection, nerve damage and scarring were reviewed. Written authorization was obtained. The patient identity, surgical site, and planned procedure(s) were verified. The provider acted as both surgeon and pathologist.     Basal cell carcinoma (BCC) of skin of other part of face  Left Upper Forehead  Mohs surgery  Consent obtained: written    Universal Protocol:  Procedure explained and questions answered to patient or proxy's satisfaction: Yes    Test results available and properly labeled: Yes    Pathology report reviewed: Yes    External notes reviewed: Yes    Photo or diagram used for site identification: Yes    Site/side marked: Yes    Slide independently reviewed by Mohs surgeon: Yes    Immediately prior to procedure a time out was called: Yes    Patient identity confirmed: verbally with patient  Preparation: Patient was prepped and draped in usual sterile fashion      Anticoagulation:  Is the patient taking prescription anticoagulant and/or aspirin prescribed/recommended by a physician? No    Was the anticoagulation regimen changed prior to Mohs? No      Anesthesia:  Anesthesia method: local infiltration  Local anesthetic: lidocaine 1% WITH epi    Procedure Details:  Case ID Number: [id number]  Biopsy accession number: [id number]  Date of  biopsy: 1/17/2025  Pre-Op diagnosis: basal cell carcinoma  Surgical site (from skin exam): Left Upper Forehead  Pre-operative length (cm): 1.8  Pre-operative width (cm): 0.8  Indications for Mohs surgery: anatomic location where tissue conservation is critical  Previously treated? No      Micrographic Surgery Details:  Post-operative length (cm): 2.8  Post-operative width (cm): 2.2  Number of Mohs stages: 2    Stage 1  Comments: The patient was brought into the operating room and placed in the procedure chair in the appropriate position.  The area positive by previous biopsy was identified and confirmed with the patient. The area of clinically obvious tumor was debulked using a curette and/or scalpel as needed. An incision was made following the Mohs approach through the skin. The specimen was taken to the lab, divided into 2 piece(s) and appropriately chromacoded and processed.  Tumor was seen on both the lateral and deep margins as indicated on the on the Mohs map.  Infiltrative basal cell carcinoma. Histologic examination revealed thin strands and small-angulated aggregates of atypical basaloid cells.  Tumor features identified on Mohs section: basal carcinoma   Depth of invasion: dermis    Stage 2  Comments: The area of positivity as noted on the Mohs map in the previous stage was identified and removed using the Mohs technique. The specimen was taken to the lab and appropriately chromacoded and processed in 2 piece(s).  Tumor features identified on Mohs section: no tumor identified  Depth of defect: subcutaneous fat  Patient tolerance of procedure: tolerated well, no immediate complications    Reconstruction:  Was the defect reconstructed? Yes    Was reconstruction performed by the same Mohs surgeon? Yes    Setting of reconstruction: outpatient office  When was reconstruction performed? same day  Type of reconstruction: linear  Linear reconstruction: complex  Subcutaneous Layers (Deep Stitches)   Suture size:   5-0  Suture type:  Vicryl  Stitches:  Buried vertical mattress  Fine/surface layer approximation (top stitches)   Fine approximation suture size: 6-0.  Superficial suture type: express gut.  Stitches: simple running    Hemostasis achieved with: electrodesiccation  Outcome: patient tolerated procedure well with no complications    Post-procedure details: sterile dressing applied and wound care instructions given    Dressing type: Hypafix, pressure dressing, Telfa pad and petrolatum      Complex Linear Repair - Wide Undermining:  Given the location and size of the defect, it was determined that a complex layered linear closure was required to restore normal anatomy and function. The repair was considered complex because extensive undermining was required and performed. The amount of undermining performed was greater than the maximum width of the defect as measured perpendicular to the closure line along at least one entire edge of the defect. Standing cutaneous cones were removed using Burow's triangles. The wound edges were brought into close approximation with buried vertical mattress sutures. The remainder of the wound was then closed with epidermal top sutures.    The final repair measured 4.6 cm              Wound care was discussed, and the patient was given written post-operative wound care instructions.      The patient will follow up with Mary Braden MD as needed for any post operative problems or concerns, and will follow up with their primary dermatologist as scheduled.

## 2025-02-28 NOTE — PROGRESS NOTES
Office Visit Note  Date: 2/28/2025  Surgeon:  Mary Braden MD  Office Location: [address]   [place]  [facility] [address]  Dept: [phone]  Dept Fax: [phone]  Referring Provider: MD Meek Hogan   Mike Olmos is a 84 y.o. male who presents for the following: MOHS Surgery    According to the patient, the lesion has been present for approximately 6 months at the time of diagnosis.  The lesion is not causing symptoms.  The lesion has not been treated previously.    The patient does not have a pacemaker / defibrillator.  The patient does have a heart valve / joint replacement.    The patient is not on blood thinners.  The patient does not have a history of hepatitis B or C.  The patient does not have a history of HIV.  The patient does have a history of immunosuppression (e.g. organ transplantation, malignancy, medications)    Review of Systems:  No other skin or systemic complaints other than what is documented elsewhere in the note.    MEDICAL HISTORY: clinically relevant history including significant past medical history, medications and allergies was reviewed and documented in Epic.    Objective   Well appearing patient in no apparent distress; mood and affect are within normal limits.  Vital signs: See record.  Noted on the Left Upper Forehead  Is a 1.8 x 0.8 cm scar    The patient confirmed the identified site.    Discussion:  The nature of the diagnosis was explained. The lesion is a skin cancer.  It has a risk of local growth and distant spread. The condition is associated with sun exposure.  Warning signs of non-melanoma skin cancer discussed. Patient was instructed to perform monthly self skin examination.  We recommended that the patient have regular full skin exams given an increased risk of subsequent skin cancers. The patient was instructed to use sun protective behaviors including use of broad spectrum sunscreens and sun protective  clothing to reduce risk of skin cancers.      Risks, benefits, side effects of Mohs surgery were discussed with patient and the patient voiced understanding.  It was explained that even though the cure rate of Mohs is very high it is not 100%. Risks of surgery including but not limited to bleeding, infection, numbness, nerve damage, and scar were reviewed.  Discussion included wound care requirements, activity restrictions, likely scar outcome and time to heal.     After Mohs surgery, the defect may need to be repaired surgically and the scar may be longer than the original lesion.  Reconstruction options, risks, and benefits were reviewed including second intention healing, linear repair (4-1 ratio was explained), local flaps, skin grafts, cartilage grafts and interpolation flaps (the need for multiple surgeries was explained). Possible outcomes were reviewed including likely scar appearance, failure of flap survival, infection, bleeding and the need for revision surgery.     The pathology was reviewed, the photograph was reviewed, and the referring physician's note was reviewed.    Patient elected for Mohs surgery.

## 2025-03-03 ENCOUNTER — SPECIALTY PHARMACY (OUTPATIENT)
Dept: PHARMACY | Facility: CLINIC | Age: 85
End: 2025-03-03

## 2025-03-06 PROCEDURE — RXMED WILLOW AMBULATORY MEDICATION CHARGE

## 2025-03-07 ENCOUNTER — PHARMACY VISIT (OUTPATIENT)
Dept: PHARMACY | Facility: CLINIC | Age: 85
End: 2025-03-07
Payer: MEDICARE

## 2025-03-17 ENCOUNTER — SPECIALTY PHARMACY (OUTPATIENT)
Dept: PHARMACY | Facility: CLINIC | Age: 85
End: 2025-03-17

## 2025-03-17 PROCEDURE — RXMED WILLOW AMBULATORY MEDICATION CHARGE

## 2025-03-21 ENCOUNTER — PHARMACY VISIT (OUTPATIENT)
Dept: PHARMACY | Facility: CLINIC | Age: 85
End: 2025-03-21
Payer: MEDICARE

## 2025-03-31 DIAGNOSIS — K64.8 OTHER HEMORRHOIDS: Primary | ICD-10-CM

## 2025-03-31 RX ORDER — HYDROCORTISONE 25 MG/G
OINTMENT TOPICAL 2 TIMES DAILY
Qty: 28 G | Refills: 0 | Status: SHIPPED | OUTPATIENT
Start: 2025-03-31

## 2025-04-03 DIAGNOSIS — K58.0 IRRITABLE BOWEL SYNDROME WITH DIARRHEA: ICD-10-CM

## 2025-04-04 ENCOUNTER — SPECIALTY PHARMACY (OUTPATIENT)
Dept: PHARMACY | Facility: CLINIC | Age: 85
End: 2025-04-04

## 2025-04-10 ENCOUNTER — SPECIALTY PHARMACY (OUTPATIENT)
Dept: PHARMACY | Facility: CLINIC | Age: 85
End: 2025-04-10

## 2025-04-14 ENCOUNTER — OFFICE VISIT (OUTPATIENT)
Dept: URGENT CARE | Age: 85
End: 2025-04-14
Payer: MEDICARE

## 2025-04-14 DIAGNOSIS — S09.90XA INJURY OF HEAD, INITIAL ENCOUNTER: Primary | ICD-10-CM

## 2025-04-14 DIAGNOSIS — K58.0 IRRITABLE BOWEL SYNDROME WITH DIARRHEA: ICD-10-CM

## 2025-04-14 ASSESSMENT — PATIENT HEALTH QUESTIONNAIRE - PHQ9
1. LITTLE INTEREST OR PLEASURE IN DOING THINGS: NOT AT ALL
SUM OF ALL RESPONSES TO PHQ9 QUESTIONS 1 AND 2: 0
2. FEELING DOWN, DEPRESSED OR HOPELESS: NOT AT ALL

## 2025-04-14 NOTE — PROGRESS NOTES
83 yo male presented with head injury that he sustained last pm. He states he does not know what happened but is guessing that he was sleep walking and ran into the wall. He states this am he noticed a dent in the drywall and blood on the carpet. He states he is dizzy as well. Patient advised that with the head injury and dizziness he needs further evaluation in the ER. Patients wife will drive him to ER.

## 2025-04-21 PROCEDURE — RXMED WILLOW AMBULATORY MEDICATION CHARGE

## 2025-04-22 ENCOUNTER — PHARMACY VISIT (OUTPATIENT)
Dept: PHARMACY | Facility: CLINIC | Age: 85
End: 2025-04-22
Payer: MEDICARE

## 2025-04-23 DIAGNOSIS — G62.9 NEUROPATHY: ICD-10-CM

## 2025-04-23 RX ORDER — NORTRIPTYLINE HYDROCHLORIDE 10 MG/1
20 CAPSULE ORAL NIGHTLY
Qty: 180 CAPSULE | Refills: 3 | Status: SHIPPED | OUTPATIENT
Start: 2025-04-23 | End: 2026-04-23

## 2025-04-29 DIAGNOSIS — K64.8 OTHER HEMORRHOIDS: Primary | ICD-10-CM

## 2025-05-14 ENCOUNTER — APPOINTMENT (OUTPATIENT)
Dept: NEUROLOGY | Facility: HOSPITAL | Age: 85
End: 2025-05-14
Payer: MEDICARE

## 2025-05-22 ENCOUNTER — APPOINTMENT (OUTPATIENT)
Dept: NEUROLOGY | Facility: CLINIC | Age: 85
End: 2025-05-22
Payer: MEDICARE

## 2025-05-22 DIAGNOSIS — R40.20 LOSS OF CONSCIOUSNESS (MULTI): Primary | ICD-10-CM

## 2025-05-22 DIAGNOSIS — G62.89 OTHER POLYNEUROPATHY: ICD-10-CM

## 2025-05-22 PROCEDURE — G2211 COMPLEX E/M VISIT ADD ON: HCPCS | Performed by: PSYCHIATRY & NEUROLOGY

## 2025-05-22 PROCEDURE — 99214 OFFICE O/P EST MOD 30 MIN: CPT | Performed by: PSYCHIATRY & NEUROLOGY

## 2025-05-22 NOTE — PROGRESS NOTES
Date of Service: 5/22/2025  Patient: Mike Olmos  MRN: [id number]  Referring Provider: No ref. provider found  Primary Care Physician: Steve Workman MD     History of Present Illness:    Mr. Olmos is a 84 y.o. male who I follow for peripheral neuropathy. He is here today for  hospital follow-up, where he had fallen during sleep, had LOC event. Work-up has been ongoing      Interval History:     Had a pacemaker placed end of March 2025, he had vertigo after pacemaker put in. He was given exercises to do for vertigo. Also saw PT for vertigo.    Had been taking Ambien to sleep. He believes his fall  and LOC event occurred due to him sleep walking and he passed out and ended up at the hospital after emergency room. He does not remember how exactly he passed out and fell.    Has not had any passing out spells since that one time.  He self-weaned off the Ambien. He takes relaxium sleep medication, has melatonin and other  natural ingredients in it.     He has a  MRA is scheduled for June 2nd, 2025.    He is doing better as far as drinking water more during the day. He denies feeling lightheaded from standing position at this time.     Problem List Items Addressed This Visit    None      RX Allergies[1]     Medications:  Current Medications[2]     Physical Exam:     There were no vitals taken for this visit.    Alert and Awake  Strength is 5/5 distal and proximal in arms and legs     Sensation is intact to light touch and pinprick in his hands and feet. Vibration is mildly reduced in the big toe as compared to the ankle and hand.     Reflexes:   2+ in bilateral biceps, and brachioradialis and 0 at the patellar and achilles bilaterally. Toes are downgoing to plantar stimulation.     Able to stand from chair and walk without difficulty, no sway or ataxia      Results:     The following labs, imaging, and results were personally reviewed and demonstrated:    Labs:  Lab Results   Component Value Date    HGBA1C 5.8  (H) 12/06/2024       Impression/Plan:     Impression:  Mike Olmos is a 84 y.o. who presents for follow-up:    1.)  Peripheral neuropathy--painful neuropathic pain,  controlled on gabapentin  1200 mg,  1200 mg, 1600 mg. When we have tapered down, the pain has flared up.    2.) New problem--had LOC event at night, was hospitalized for it at [facility]. He had a pacemaker placed prior to that event and was not related to heart rhythm abnormality.  He believes it was due to taking Ambien which he stopped since then. As part of the work-up his doctors ordered MRA scheduled for June 2, 2025.    Plan:  -- renewed gabapentin  -- MRA of the Brain ordered by physicians working up his LOC event, scheduled for first week of June 2025.       -------------------------------------------------------------------    I personally spent 30 minutes on the day of the visit completing the review of the medical record and outside records, obtaining history and performing an appropriate physical exam, patient care, counseling and education, placing orders, independently reviewing results, communicating with the patient, coordinating care.    Diane Reich MD  Neuromuscular Neurology  [facility]  Office Phone Number: [phone]        [1] No Known Allergies  [2]   Current Outpatient Medications:     aspirin 81 mg EC tablet, Take 1 tablet (81 mg) by mouth once daily., Disp: , Rfl:     atorvastatin (Lipitor) 40 mg tablet, Take 1 tablet (40 mg) by mouth once daily at bedtime., Disp: , Rfl:     cholecalciferol (Vitamin D-3) 50 mcg (2,000 unit) capsule, Take 2 tablets by mouth once daily., Disp: , Rfl:     gabapentin (Neurontin) 400 mg capsule, Take 3 caps in the morning, 3 caps in the afternoon, and 4 caps in the evening., Disp: 300 capsule, Rfl: 6    meloxicam (Mobic) 7.5 mg tablet, Take 1 tablet (7.5 mg) by mouth once daily., Disp: , Rfl:     metoprolol tartrate (Lopressor) 25 mg tablet, TAKE ONE-HALF (1/2) TABLET BY MOUTH EVERY 12  HOURS, Disp: , Rfl:     nortriptyline (Pamelor) 10 mg capsule, Take 2 capsules (20 mg) by mouth once daily at bedtime., Disp: 180 capsule, Rfl: 3    omeprazole (PriLOSEC) 20 mg DR capsule, Take 1 capsule (20 mg) by mouth 2 times a day., Disp: , Rfl:     tamsulosin (Flomax) 0.4 mg 24 hr capsule, Take 1 capsule (0.4 mg) by mouth once daily at bedtime., Disp: , Rfl:

## 2025-05-31 RX ORDER — GABAPENTIN 400 MG/1
CAPSULE ORAL
Qty: 300 CAPSULE | Refills: 6 | Status: SHIPPED | OUTPATIENT
Start: 2025-05-31

## 2025-07-10 ENCOUNTER — APPOINTMENT (OUTPATIENT)
Dept: NEUROLOGY | Facility: CLINIC | Age: 85
End: 2025-07-10
Payer: MEDICARE

## 2025-12-04 ENCOUNTER — APPOINTMENT (OUTPATIENT)
Dept: NEUROLOGY | Facility: CLINIC | Age: 85
End: 2025-12-04
Payer: MEDICARE